# Patient Record
Sex: MALE | Race: WHITE | NOT HISPANIC OR LATINO | URBAN - METROPOLITAN AREA
[De-identification: names, ages, dates, MRNs, and addresses within clinical notes are randomized per-mention and may not be internally consistent; named-entity substitution may affect disease eponyms.]

---

## 2017-01-30 ENCOUNTER — OUTPATIENT (OUTPATIENT)
Dept: OUTPATIENT SERVICES | Facility: HOSPITAL | Age: 67
LOS: 1 days | End: 2017-01-30
Payer: COMMERCIAL

## 2017-01-30 VITALS
RESPIRATION RATE: 19 BRPM | HEART RATE: 82 BPM | DIASTOLIC BLOOD PRESSURE: 89 MMHG | SYSTOLIC BLOOD PRESSURE: 139 MMHG | OXYGEN SATURATION: 99 %

## 2017-01-30 VITALS
DIASTOLIC BLOOD PRESSURE: 89 MMHG | HEART RATE: 67 BPM | HEIGHT: 73 IN | WEIGHT: 227.08 LBS | OXYGEN SATURATION: 97 % | TEMPERATURE: 99 F | SYSTOLIC BLOOD PRESSURE: 139 MMHG | RESPIRATION RATE: 17 BRPM

## 2017-01-30 DIAGNOSIS — Z98.41 CATARACT EXTRACTION STATUS, RIGHT EYE: Chronic | ICD-10-CM

## 2017-01-30 DIAGNOSIS — M67.40 GANGLION, UNSPECIFIED SITE: Chronic | ICD-10-CM

## 2017-01-30 DIAGNOSIS — H33.011 RETINAL DETACHMENT WITH SINGLE BREAK, RIGHT EYE: ICD-10-CM

## 2017-01-30 PROCEDURE — C1814: CPT

## 2017-01-30 PROCEDURE — C1889: CPT

## 2017-01-30 PROCEDURE — 67108 REPAIR DETACHED RETINA: CPT | Mod: RT

## 2017-01-30 PROCEDURE — C1784: CPT

## 2017-01-30 NOTE — ASU DISCHARGE PLAN (ADULT/PEDIATRIC). - NOTIFY
Pain not relieved by Medications/Increased Irritability or Sluggishness/Bleeding that does not stop/Persistent Nausea and Vomiting/Numbness, color, or temperature change to extremity/Unable to Urinate/Swelling that continues/Fever greater than 101/Numbness, tingling/Inability to Tolerate Liquids or Foods

## 2018-04-11 ENCOUNTER — APPOINTMENT (OUTPATIENT)
Dept: RADIOLOGY | Facility: CLINIC | Age: 68
End: 2018-04-11

## 2018-04-11 ENCOUNTER — OUTPATIENT (OUTPATIENT)
Dept: OUTPATIENT SERVICES | Facility: HOSPITAL | Age: 68
LOS: 1 days | End: 2018-04-11
Payer: COMMERCIAL

## 2018-04-11 DIAGNOSIS — Z00.8 ENCOUNTER FOR OTHER GENERAL EXAMINATION: ICD-10-CM

## 2018-04-11 DIAGNOSIS — M67.40 GANGLION, UNSPECIFIED SITE: Chronic | ICD-10-CM

## 2018-04-11 DIAGNOSIS — Z98.41 CATARACT EXTRACTION STATUS, RIGHT EYE: Chronic | ICD-10-CM

## 2018-04-11 PROBLEM — Z00.00 ENCOUNTER FOR PREVENTIVE HEALTH EXAMINATION: Status: ACTIVE | Noted: 2018-04-11

## 2018-04-11 PROCEDURE — 74018 RADEX ABDOMEN 1 VIEW: CPT | Mod: 26

## 2018-04-11 PROCEDURE — 74018 RADEX ABDOMEN 1 VIEW: CPT

## 2018-04-12 PROBLEM — I10 ESSENTIAL (PRIMARY) HYPERTENSION: Chronic | Status: ACTIVE | Noted: 2017-01-30

## 2018-04-12 PROBLEM — R97.20 ELEVATED PROSTATE SPECIFIC ANTIGEN [PSA]: Chronic | Status: ACTIVE | Noted: 2017-01-30

## 2018-04-12 PROBLEM — M54.5 LOW BACK PAIN: Chronic | Status: ACTIVE | Noted: 2017-01-30

## 2018-04-12 PROBLEM — E78.5 HYPERLIPIDEMIA, UNSPECIFIED: Chronic | Status: ACTIVE | Noted: 2017-01-30

## 2018-07-27 NOTE — ASU PATIENT PROFILE, ADULT - PSH
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

## 2018-07-29 ENCOUNTER — TRANSCRIPTION ENCOUNTER (OUTPATIENT)
Age: 68
End: 2018-07-29

## 2018-07-30 ENCOUNTER — OUTPATIENT (OUTPATIENT)
Dept: OUTPATIENT SERVICES | Facility: HOSPITAL | Age: 68
LOS: 1 days | End: 2018-07-30
Payer: COMMERCIAL

## 2018-07-30 VITALS
TEMPERATURE: 99 F | HEIGHT: 72.5 IN | DIASTOLIC BLOOD PRESSURE: 86 MMHG | RESPIRATION RATE: 20 BRPM | SYSTOLIC BLOOD PRESSURE: 146 MMHG | OXYGEN SATURATION: 95 % | WEIGHT: 222.67 LBS | HEART RATE: 75 BPM

## 2018-07-30 VITALS
OXYGEN SATURATION: 97 % | DIASTOLIC BLOOD PRESSURE: 80 MMHG | RESPIRATION RATE: 15 BRPM | SYSTOLIC BLOOD PRESSURE: 135 MMHG | HEART RATE: 90 BPM

## 2018-07-30 DIAGNOSIS — Z98.41 CATARACT EXTRACTION STATUS, RIGHT EYE: Chronic | ICD-10-CM

## 2018-07-30 DIAGNOSIS — H33.21 SEROUS RETINAL DETACHMENT, RIGHT EYE: Chronic | ICD-10-CM

## 2018-07-30 DIAGNOSIS — H40.41X3 GLAUCOMA SECONDARY TO EYE INFLAMMATION, RIGHT EYE, SEVERE STAGE: ICD-10-CM

## 2018-07-30 DIAGNOSIS — M67.40 GANGLION, UNSPECIFIED SITE: Chronic | ICD-10-CM

## 2018-07-30 PROCEDURE — C1762: CPT

## 2018-07-30 PROCEDURE — 66180 AQUEOUS SHUNT EYE W/GRAFT: CPT | Mod: RT

## 2018-07-30 PROCEDURE — C1783: CPT

## 2018-07-30 NOTE — ASU DISCHARGE PLAN (ADULT/PEDIATRIC). - NOTIFY
Pain not relieved by Medications/Fever greater than 101/Inability to Tolerate Liquids or Foods/Bleeding that does not stop/Persistent Nausea and Vomiting
